# Patient Record
Sex: MALE | Race: WHITE | NOT HISPANIC OR LATINO
[De-identification: names, ages, dates, MRNs, and addresses within clinical notes are randomized per-mention and may not be internally consistent; named-entity substitution may affect disease eponyms.]

---

## 2024-05-03 PROBLEM — Z00.00 ENCOUNTER FOR PREVENTIVE HEALTH EXAMINATION: Status: ACTIVE | Noted: 2024-05-03

## 2024-05-08 ENCOUNTER — NON-APPOINTMENT (OUTPATIENT)
Age: 39
End: 2024-05-08

## 2024-05-10 ENCOUNTER — APPOINTMENT (OUTPATIENT)
Dept: COLORECTAL SURGERY | Facility: CLINIC | Age: 39
End: 2024-05-10
Payer: MEDICAID

## 2024-05-10 ENCOUNTER — NON-APPOINTMENT (OUTPATIENT)
Age: 39
End: 2024-05-10

## 2024-05-10 VITALS
WEIGHT: 214 LBS | BODY MASS INDEX: 34.39 KG/M2 | DIASTOLIC BLOOD PRESSURE: 85 MMHG | RESPIRATION RATE: 16 BRPM | TEMPERATURE: 98.1 F | HEART RATE: 72 BPM | OXYGEN SATURATION: 98 % | HEIGHT: 66 IN | SYSTOLIC BLOOD PRESSURE: 146 MMHG

## 2024-05-10 DIAGNOSIS — Z82.49 FAMILY HISTORY OF ISCHEMIC HEART DISEASE AND OTHER DISEASES OF THE CIRCULATORY SYSTEM: ICD-10-CM

## 2024-05-10 DIAGNOSIS — I10 ESSENTIAL (PRIMARY) HYPERTENSION: ICD-10-CM

## 2024-05-10 DIAGNOSIS — K60.2 ANAL FISSURE, UNSPECIFIED: ICD-10-CM

## 2024-05-10 PROCEDURE — 99203 OFFICE O/P NEW LOW 30 MIN: CPT

## 2024-05-10 RX ORDER — METOPROLOL TARTRATE 50 MG/1
50 TABLET, FILM COATED ORAL DAILY
Refills: 0 | Status: ACTIVE | COMMUNITY

## 2024-05-10 RX ORDER — CHLORTHALIDONE 25 MG/1
25 TABLET ORAL DAILY
Refills: 0 | Status: ACTIVE | COMMUNITY

## 2024-05-10 RX ORDER — AMLODIPINE BESYLATE 10 MG/1
10 TABLET ORAL DAILY
Refills: 0 | Status: ACTIVE | COMMUNITY

## 2024-05-10 RX ORDER — OLMESARTAN MEDOXOMIL 40 MG/1
40 TABLET, FILM COATED ORAL DAILY
Refills: 0 | Status: ACTIVE | COMMUNITY

## 2024-05-10 NOTE — PHYSICAL EXAM
[Abdomen Masses] : No abdominal masses [Abdomen Tenderness] : ~T No ~M abdominal tenderness [Posterior] : posteriorly [Excoriation] : no perianal excoriation [Fistula] : no fistulas [Wart] : no warts [Ulcer ___ cm] : no ulcers [Nonprolapsing] : a nonprolapsing (grade I) [Tender, Swollen] : nontender, non-swollen [Thrombosed] : that was not thrombosed [Skin Tags] : residual hemorrhoidal skin tags were noted [Normal Breath Sounds] : Normal breath sounds [Normal Heart Sounds] : normal heart sounds [2+] : left 2+ [No Rash or Lesion] : No rash or lesion [Alert] : alert [Oriented to Person] : oriented to person [Oriented to Place] : oriented to place [Oriented to Time] : oriented to time [Calm] : calm [de-identified] : benign [de-identified] : posterior anal fissure, 3/4 healed but clearcut.  Associated skin tag.  No digital or anoscopy done.  No evidence of abscess [de-identified] : NAD

## 2024-05-10 NOTE — ASSESSMENT
[FreeTextEntry1] : Posterior anal fissure.  Partly healed with clearcut associated skin tag. Has had it x years with intermittent symptoms.  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 Needs colonoscopy when healed. Given fissure handouts and dietary recs Diltiazem cream prescribed. He uses bidet daily (sitz baths not necessary if bidet used) Dieatry changes and need for increased water and colace and fiber supplements

## 2024-05-10 NOTE — REVIEW OF SYSTEMS
[Melena] : melchristopher [Itching] : itching [Negative] : Heme/Lymph [Fever] : no fever [Chills] : no chills [Feeling Poorly] : not feeling poorly [Feeling Tired] : not feeling tired [Recent Weight Gain (___ Lbs)] : no recent weight gain [Recent Weight Loss (___ Lbs)] : no recent weight loss [Sore Throat] : no sore throat [Hoarseness] : no hoarseness [Chest Pain] : no chest pain [Palpitations] : no palpitations [Leg Claudication] : no intermittent leg claudication [Lower Ext Edema] : no extremity edema [Shortness Of Breath] : no shortness of breath [Cough] : no cough [SOB on Exertion] : no shortness of breath during exertion [Abdominal Pain] : no abdominal pain [Vomiting] : no vomiting [Constipation] : no constipation [Diarrhea] : no diarrhea [Heartburn] : no heartburn [Dysuria] : no dysuria [Incontinence] : no incontinence [Hesitancy] : no urinary hesitancy [Nocturia] : no nocturia [Skin Lesions] : no skin lesions [Skin Wound] : no skin wound [Confused] : no confusion [Convulsions] : no convulsions [Dizziness] : no dizziness [Fainting] : no fainting [Suicidal] : not suicidal [Sleep Disturbances] : no sleep disturbances [Anxiety] : no anxiety [Depression] : no depression [Easy Bleeding] : no tendency for easy bleeding [Easy Bruising] : no tendency for easy bruising

## 2024-05-10 NOTE — HISTORY OF PRESENT ILLNESS
[FreeTextEntry1] : 39 y/o M with PMHx of HTN, and PSHx of right inguinal hernia repair 20 years ago here for evaluation of anal pain with bowel movements. Pain started about 6 weeks ago. Pain was with BM. Described pain as sharp, tearing pain. Pain last about 2 hours post BM. Pain initially started after having a large BM. He also reports bright red blood in toilet with BM. He had seen PCP and was started on Colace 100 mg daily two weeks ago. No pain is better. Rate pain at 1/10. No more bleeding. Last bleeding was 4/28/24 ago. Report some perianal itching.   Never had colonoscopy.